# Patient Record
Sex: MALE | Race: WHITE | NOT HISPANIC OR LATINO | Employment: FULL TIME | URBAN - METROPOLITAN AREA
[De-identification: names, ages, dates, MRNs, and addresses within clinical notes are randomized per-mention and may not be internally consistent; named-entity substitution may affect disease eponyms.]

---

## 2020-11-09 ENCOUNTER — HOSPITAL ENCOUNTER (OUTPATIENT)
Facility: HOSPITAL | Age: 20
Discharge: HOME | End: 2020-11-10
Attending: EMERGENCY MEDICINE
Payer: OTHER MISCELLANEOUS

## 2020-11-09 ENCOUNTER — ANESTHESIA EVENT (OUTPATIENT)
Dept: ENDOSCOPY | Facility: HOSPITAL | Age: 20
End: 2020-11-09
Payer: OTHER MISCELLANEOUS

## 2020-11-09 ENCOUNTER — ANESTHESIA (OUTPATIENT)
Dept: ENDOSCOPY | Facility: HOSPITAL | Age: 20
End: 2020-11-09
Payer: OTHER MISCELLANEOUS

## 2020-11-09 DIAGNOSIS — T18.9XXA SWALLOWED FOREIGN BODY: ICD-10-CM

## 2020-11-09 DIAGNOSIS — R07.9 CHEST PAIN: ICD-10-CM

## 2020-11-09 DIAGNOSIS — T17.908A INHALED FOREIGN OBJECT, INITIAL ENCOUNTER: Primary | ICD-10-CM

## 2020-11-09 LAB
ABO + RH BLD: NORMAL
ANION GAP SERPL CALC-SCNC: 8 MMOL/L (ref 8–16)
BASOPHILS # BLD AUTO: 0.08 K/UL (ref 0–0.2)
BASOPHILS NFR BLD: 0.9 % (ref 0–1.9)
BLD GP AB SCN CELLS X3 SERPL QL: NORMAL
BUN SERPL-MCNC: 12 MG/DL (ref 6–20)
CALCIUM SERPL-MCNC: 10.9 MG/DL (ref 8.7–10.5)
CHLORIDE SERPL-SCNC: 103 MMOL/L (ref 95–110)
CO2 SERPL-SCNC: 30 MMOL/L (ref 23–29)
CREAT SERPL-MCNC: 0.8 MG/DL (ref 0.5–1.4)
DIFFERENTIAL METHOD: NORMAL
EOSINOPHIL # BLD AUTO: 0.1 K/UL (ref 0–0.5)
EOSINOPHIL NFR BLD: 0.6 % (ref 0–8)
ERYTHROCYTE [DISTWIDTH] IN BLOOD BY AUTOMATED COUNT: 12.3 % (ref 11.5–14.5)
EST. GFR  (AFRICAN AMERICAN): >60 ML/MIN/1.73 M^2
EST. GFR  (NON AFRICAN AMERICAN): >60 ML/MIN/1.73 M^2
GLUCOSE SERPL-MCNC: 91 MG/DL (ref 70–110)
HCT VFR BLD AUTO: 51.7 % (ref 40–54)
HGB BLD-MCNC: 17.5 G/DL (ref 14–18)
IMM GRANULOCYTES # BLD AUTO: 0.02 K/UL (ref 0–0.04)
IMM GRANULOCYTES NFR BLD AUTO: 0.2 % (ref 0–0.5)
LYMPHOCYTES # BLD AUTO: 3.5 K/UL (ref 1–4.8)
LYMPHOCYTES NFR BLD: 39.1 % (ref 18–48)
MCH RBC QN AUTO: 31 PG (ref 27–31)
MCHC RBC AUTO-ENTMCNC: 33.8 G/DL (ref 32–36)
MCV RBC AUTO: 92 FL (ref 82–98)
MONOCYTES # BLD AUTO: 0.8 K/UL (ref 0.3–1)
MONOCYTES NFR BLD: 8.6 % (ref 4–15)
NEUTROPHILS # BLD AUTO: 4.6 K/UL (ref 1.8–7.7)
NEUTROPHILS NFR BLD: 50.6 % (ref 38–73)
NRBC BLD-RTO: 0 /100 WBC
PLATELET # BLD AUTO: 227 K/UL (ref 150–350)
PMV BLD AUTO: 10.7 FL (ref 9.2–12.9)
POTASSIUM SERPL-SCNC: 4.1 MMOL/L (ref 3.5–5.1)
RBC # BLD AUTO: 5.65 M/UL (ref 4.6–6.2)
SARS-COV-2 RDRP RESP QL NAA+PROBE: NEGATIVE
SODIUM SERPL-SCNC: 141 MMOL/L (ref 136–145)
WBC # BLD AUTO: 9.02 K/UL (ref 3.9–12.7)

## 2020-11-09 PROCEDURE — U0002 COVID-19 LAB TEST NON-CDC: HCPCS

## 2020-11-09 PROCEDURE — 25000003 PHARM REV CODE 250: Performed by: STUDENT IN AN ORGANIZED HEALTH CARE EDUCATION/TRAINING PROGRAM

## 2020-11-09 PROCEDURE — D9220A PRA ANESTHESIA: Mod: ,,, | Performed by: ANESTHESIOLOGY

## 2020-11-09 PROCEDURE — 63600175 PHARM REV CODE 636 W HCPCS: Performed by: STUDENT IN AN ORGANIZED HEALTH CARE EDUCATION/TRAINING PROGRAM

## 2020-11-09 PROCEDURE — 37000008 HC ANESTHESIA 1ST 15 MINUTES: Performed by: INTERNAL MEDICINE

## 2020-11-09 PROCEDURE — 93010 EKG 12-LEAD: ICD-10-PCS | Mod: ,,, | Performed by: INTERNAL MEDICINE

## 2020-11-09 PROCEDURE — G0378 HOSPITAL OBSERVATION PER HR: HCPCS

## 2020-11-09 PROCEDURE — 93005 ELECTROCARDIOGRAM TRACING: CPT

## 2020-11-09 PROCEDURE — 93010 ELECTROCARDIOGRAM REPORT: CPT | Mod: ,,, | Performed by: INTERNAL MEDICINE

## 2020-11-09 PROCEDURE — 80048 BASIC METABOLIC PNL TOTAL CA: CPT

## 2020-11-09 PROCEDURE — 85025 COMPLETE CBC W/AUTO DIFF WBC: CPT

## 2020-11-09 PROCEDURE — 86901 BLOOD TYPING SEROLOGIC RH(D): CPT

## 2020-11-09 PROCEDURE — 37000009 HC ANESTHESIA EA ADD 15 MINS: Performed by: INTERNAL MEDICINE

## 2020-11-09 PROCEDURE — 43235 EGD DIAGNOSTIC BRUSH WASH: CPT | Performed by: INTERNAL MEDICINE

## 2020-11-09 PROCEDURE — 99285 EMERGENCY DEPT VISIT HI MDM: CPT | Mod: 25

## 2020-11-09 PROCEDURE — D9220A PRA ANESTHESIA: ICD-10-PCS | Mod: ,,, | Performed by: ANESTHESIOLOGY

## 2020-11-09 RX ORDER — TALC
6 POWDER (GRAM) TOPICAL NIGHTLY PRN
Status: DISCONTINUED | OUTPATIENT
Start: 2020-11-09 | End: 2020-11-10 | Stop reason: HOSPADM

## 2020-11-09 RX ORDER — PROPOFOL 10 MG/ML
VIAL (ML) INTRAVENOUS
Status: DISCONTINUED | OUTPATIENT
Start: 2020-11-09 | End: 2020-11-09

## 2020-11-09 RX ORDER — SUCCINYLCHOLINE CHLORIDE 20 MG/ML
INJECTION INTRAMUSCULAR; INTRAVENOUS
Status: DISPENSED
Start: 2020-11-09 | End: 2020-11-10

## 2020-11-09 RX ORDER — ROCURONIUM BROMIDE 10 MG/ML
INJECTION, SOLUTION INTRAVENOUS
Status: DISCONTINUED | OUTPATIENT
Start: 2020-11-09 | End: 2020-11-09

## 2020-11-09 RX ORDER — LIDOCAINE HYDROCHLORIDE 20 MG/ML
INJECTION, SOLUTION EPIDURAL; INFILTRATION; INTRACAUDAL; PERINEURAL
Status: DISPENSED
Start: 2020-11-09 | End: 2020-11-10

## 2020-11-09 RX ORDER — FENTANYL CITRATE 50 UG/ML
INJECTION, SOLUTION INTRAMUSCULAR; INTRAVENOUS
Status: COMPLETED
Start: 2020-11-09 | End: 2020-11-09

## 2020-11-09 RX ORDER — PHENYLEPHRINE HCL IN 0.9% NACL 1 MG/10 ML
SYRINGE (ML) INTRAVENOUS
Status: DISCONTINUED
Start: 2020-11-09 | End: 2020-11-09 | Stop reason: WASHOUT

## 2020-11-09 RX ORDER — PROPOFOL 10 MG/ML
VIAL (ML) INTRAVENOUS CONTINUOUS PRN
Status: DISCONTINUED | OUTPATIENT
Start: 2020-11-09 | End: 2020-11-09

## 2020-11-09 RX ORDER — SODIUM CHLORIDE 0.9 % (FLUSH) 0.9 %
10 SYRINGE (ML) INJECTION
Status: DISCONTINUED | OUTPATIENT
Start: 2020-11-09 | End: 2020-11-10 | Stop reason: HOSPADM

## 2020-11-09 RX ORDER — ONDANSETRON 2 MG/ML
INJECTION INTRAMUSCULAR; INTRAVENOUS
Status: DISCONTINUED | OUTPATIENT
Start: 2020-11-09 | End: 2020-11-09

## 2020-11-09 RX ORDER — ACETAMINOPHEN 325 MG/1
650 TABLET ORAL EVERY 6 HOURS PRN
Status: DISCONTINUED | OUTPATIENT
Start: 2020-11-09 | End: 2020-11-10 | Stop reason: HOSPADM

## 2020-11-09 RX ORDER — MIDAZOLAM HYDROCHLORIDE 1 MG/ML
INJECTION, SOLUTION INTRAMUSCULAR; INTRAVENOUS
Status: DISCONTINUED | OUTPATIENT
Start: 2020-11-09 | End: 2020-11-09

## 2020-11-09 RX ORDER — LIDOCAINE HYDROCHLORIDE 20 MG/ML
INJECTION INTRAVENOUS
Status: DISCONTINUED | OUTPATIENT
Start: 2020-11-09 | End: 2020-11-09

## 2020-11-09 RX ORDER — PROPOFOL 10 MG/ML
INJECTION, EMULSION INTRAVENOUS
Status: COMPLETED
Start: 2020-11-09 | End: 2020-11-09

## 2020-11-09 RX ORDER — EPHEDRINE SULFATE 50 MG/ML
INJECTION, SOLUTION INTRAVENOUS
Status: DISCONTINUED
Start: 2020-11-09 | End: 2020-11-09 | Stop reason: WASHOUT

## 2020-11-09 RX ORDER — MIDAZOLAM HYDROCHLORIDE 1 MG/ML
INJECTION INTRAMUSCULAR; INTRAVENOUS
Status: COMPLETED
Start: 2020-11-09 | End: 2020-11-09

## 2020-11-09 RX ORDER — FENTANYL CITRATE 50 UG/ML
INJECTION, SOLUTION INTRAMUSCULAR; INTRAVENOUS
Status: DISCONTINUED | OUTPATIENT
Start: 2020-11-09 | End: 2020-11-09

## 2020-11-09 RX ORDER — ROCURONIUM BROMIDE 10 MG/ML
INJECTION, SOLUTION INTRAVENOUS
Status: DISPENSED
Start: 2020-11-09 | End: 2020-11-10

## 2020-11-09 RX ORDER — ONDANSETRON 2 MG/ML
INJECTION INTRAMUSCULAR; INTRAVENOUS
Status: COMPLETED
Start: 2020-11-09 | End: 2020-11-09

## 2020-11-09 RX ORDER — SODIUM CHLORIDE 9 MG/ML
INJECTION, SOLUTION INTRAVENOUS CONTINUOUS PRN
Status: DISCONTINUED | OUTPATIENT
Start: 2020-11-09 | End: 2020-11-09

## 2020-11-09 RX ADMIN — ONDANSETRON 4 MG: 2 INJECTION, SOLUTION INTRAMUSCULAR; INTRAVENOUS at 04:11

## 2020-11-09 RX ADMIN — MIDAZOLAM 2 MG: 1 INJECTION INTRAMUSCULAR; INTRAVENOUS at 04:11

## 2020-11-09 RX ADMIN — SUGAMMADEX 200 MG: 100 INJECTION, SOLUTION INTRAVENOUS at 05:11

## 2020-11-09 RX ADMIN — SUGAMMADEX 400 MG: 100 INJECTION, SOLUTION INTRAVENOUS at 05:11

## 2020-11-09 RX ADMIN — ROCURONIUM BROMIDE 50 MG: 10 INJECTION, SOLUTION INTRAVENOUS at 04:11

## 2020-11-09 RX ADMIN — SODIUM CHLORIDE: 0.9 INJECTION, SOLUTION INTRAVENOUS at 03:11

## 2020-11-09 RX ADMIN — PROPOFOL 50 MCG/KG/MIN: 10 INJECTION, EMULSION INTRAVENOUS at 04:11

## 2020-11-09 RX ADMIN — Medication 100 MG: at 04:11

## 2020-11-09 RX ADMIN — FENTANYL CITRATE 50 MCG: 50 INJECTION, SOLUTION INTRAMUSCULAR; INTRAVENOUS at 04:11

## 2020-11-09 RX ADMIN — PROPOFOL 150 MG: 10 INJECTION, EMULSION INTRAVENOUS at 04:11

## 2020-11-09 RX ADMIN — FENTANYL CITRATE 25 MCG: 50 INJECTION, SOLUTION INTRAMUSCULAR; INTRAVENOUS at 04:11

## 2020-11-09 NOTE — CONSULTS
.[facility]  Gastroenterology  Consult Note    Patient Name: Per Thomas  MRN: [id number]  Admission Date: 11/9/2020  Hospital Length of Stay: 0 days  Code Status: No Order   Primary Care Physician: To Obtain Unable  Principal Problem:<principal problem not specified>    Consults  Subjective:     Chief complaint: Chest pain.    HPI: The patient is a 20 year old male with a no medical history presenting with chest and abdominal pain after accidentally swallowing a toothpick.  He reports accidentally inhaling and swallowing a toothpick around 2 am today.  He immediately developed chest and upper abdominal pain, which is exacerbated by taking a breath.  He has been able to drink liquids but reports significant pain.  He denies nausea or vomiting.  No fever or chills.  His last bowel movement was early this morning just prior to the incident.      Past medical history:  Denies.    Past surgical history:  Appendectomy.  Adenoidectomy.    Social history:  Tobacco use: denies.  Alcohol use: denies.  Illicit drug use: denies.    Family history:  No family history of colorectal cancer.    Medications:  (Not in a hospital admission)      Allergies:  No known drug allergies.    Review of systems:  CONSTITUTIONAL: Negative for fever, chills, weakness, weight loss, weight gain.  HEENT: Negative for blurred vision, hearing loss, nasal congestion, dry mouth, sore throat.  CARDIOVASCULAR: Positive for chest pain.  RESPIRATORY: Negative for SOB or cough.  GASTROINTESTINAL: See HPI  GENITOURINARY: Negative for dysuria or hematuria.  MUSCULOSKELETAL: Negative for osteoarthritis or muscle pain.  SKIN: Negative for rashes/lesions.  NEUROLOGIC: Negative for headaches, numbness/tingling.  ENDOCRINE: Negative for diabetes or thyroid abnormalities.  HEMATOLOGIC: Negative for anemia or blood dyscrasias.    Objective:     Vital Signs (Most Recent):  Temp: 97.8 °F (36.6 °C) (11/09/20 1056)  Pulse: 65 (11/09/20 1057)  Resp:  20 (11/09/20 1057)  BP: 139/70 (11/09/20 1057)  SpO2: 96 % (11/09/20 1057) Vital Signs (24h Range):  Temp:  [97.8 °F (36.6 °C)] 97.8 °F (36.6 °C)  Pulse:  [65] 65  Resp:  [20] 20  SpO2:  [96 %] 96 %  BP: (139)/(70) 139/70     Physical examination:  General: Well developed WM in no acute distress.  HENT: NCAT, atraumatic, hearing grossly intact, no visible or palpable thyroid mass  Eyes: PERRL, EOMI, anicteric sclera  Cardiovascular: Regular rate and rhythm. No peripheral edema.   Lungs: Non-labored respirations. Breath sounds equal.   Abdomen: soft, mild epigastric/LUQ TTP, nondistended, normoactive BS  Extremities: No C/C, 2+ dorsalis pedis pulses bilaterally  Neuro: AA&O x 3, no asterixes or tremors  Psych: Appropriate mood and affect. No SI.  Skin: No jaundice, rashes or lesions  Musculoskeletal: 5/5 strength bilaterally    CBC: No results for input(s): WBC, HGB, HCT, PLT in the last 48 hours.    Imaging:  CT abd/chest without contrast (11/9/20):  Impression:  No acute process seen within the chest or imaged abdomen.  Specifically, no radiodense foreign body identified within the trachea, esophagus, stomach or imaged bowel loops.  Suspected postsurgical changes of remote appendectomy.    X-ray chest (11/9/20):  Impression:  No radiographic acute intrathoracic process seen.  No radiodense foreign body projected over the imaged chest or upper abdomen.      Assessment:   20 year old male with a no medical history presenting with chest and abdominal pain after accidentally swallowing a toothpick.  No obvious evidence of foreign body on CT or x-ray.  VSS.    Plan:   1.  EGD +/- removal of foreign body today.  2.  NPO.  3.  Anesthesiology evaluation.    Thank you for your consult.     Brynn Soares PA-C  Gastroenterology  [facility]

## 2020-11-09 NOTE — TRANSFER OF CARE
Anesthesia Transfer of Care Note    Patient: Per Thomas    Procedure(s) Performed: Procedure(s) (LRB):  EGD (ESOPHAGOGASTRODUODENOSCOPY) (N/A)    Patient location: GI    Anesthesia Type: general    Transport from OR: Transported from OR on room air with adequate spontaneous ventilation    Post pain: adequate analgesia    Post assessment: no apparent anesthetic complications and tolerated procedure well    Post vital signs: stable    Level of consciousness: awake    Nausea/Vomiting: no nausea/vomiting    Complications: none    Transfer of care protocol was followed      Last vitals:   Visit Vitals  /66   Pulse 108   Temp 36.5 °C (97.7 °F) (Skin)   Resp 18   Ht 6' (1.829 m)   Wt 74.8 kg (165 lb)   SpO2 96%   BMI 22.38 kg/m²

## 2020-11-09 NOTE — ED PROVIDER NOTES
"Encounter Date: 11/9/2020       History     Chief Complaint   Patient presents with    Chest Pain     pt staes he inhaled a toothpick at 2 am     Mr. Thomas is a 21 y/o  male with a PSHx of an appendectomy who presents today after inhaling a toothpick about 9 hours ago. 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 He initially felt pain in the pack of his throat, then in his chest at nipple level. He was sent to urgent care in [facility] by his work at around 8 am, when he drank water which was painful "going down." They then sent him to the ED, and he currently states that he his experiencing pleuritic, sternal chest pain at nipple level as well as left sided abdominal pain. He denies taking any medication for the pain, as well as SOB, urinary complaints, nausea, diarrhea, or trouble swallowing. He tried to induce vomiting on 3 separate occasions with no success.    The history is provided by the patient. No  was used.     Review of patient's allergies indicates:  No Known Allergies  Past Medical History:   Diagnosis Date    Childhood asthma     History of Asperger's syndrome     Inhaled foreign object 11/09/2020    toothpick     Past Surgical History:   Procedure Laterality Date    ADENOIDECTOMY      APPENDECTOMY      ESOPHAGOGASTRODUODENOSCOPY N/A 11/9/2020    Procedure: EGD (ESOPHAGOGASTRODUODENOSCOPY);  Surgeon: Geovanny Isidro MD;  Location: [facility];  Service: Gastroenterology;  Laterality: N/A;    TONSILLECTOMY       History reviewed. No pertinent family history.  Social History     Tobacco Use    Smoking status: Never Smoker    Smokeless tobacco: Never Used   Substance Use Topics    Alcohol use: Never     Frequency: Never    Drug use: Not Currently     Review of Systems  Constitutional: Negative for fever.   HENT: Positive for " sore throat. Negative for trouble swallowing.    Respiratory: Negative for shortness of breath.    Cardiovascular: Positive for chest pain.   Gastrointestinal: Positive for abdominal pain. Negative for constipation, diarrhea, nausea and vomiting.   Genitourinary: Negative for difficulty urinating and dysuria.   Musculoskeletal: Negative for back pain.   Skin: Negative for rash.   Neurological: Negative for weakness and headaches.   Hematological: Does not bruise/bleed easily.   Physical Exam     Initial Vitals   BP Pulse Resp Temp SpO2   11/09/20 1057 11/09/20 1057 11/09/20 1057 11/09/20 1056 11/09/20 1057   139/70 65 20 97.8 °F (36.6 °C) 96 %      MAP       --                Physical Exam  Constitutional: He appears well-developed and well-nourished. No distress.   HENT:   Head: Normocephalic and atraumatic.   Eythematous, moist oropharynx without exudate. Swallowing intact.   Eyes: Pupils are equal, round, and reactive to light.   Neck: Normal range of motion. Neck supple.   Cardiovascular: Normal rate, regular rhythm, normal heart sounds and intact distal pulses.   Pulmonary/Chest: Breath sounds normal. No respiratory distress. He has no wheezes. He has no rhonchi. He has no rales. He exhibits no tenderness.   Abdominal: Soft. Bowel sounds are normal. He exhibits no distension. There is abdominal tenderness (Epigastrum and left sided). There is no rebound and no guarding.   Musculoskeletal: Normal range of motion.   Neurological: He is alert and oriented to person, place, and time. He has normal strength.   Skin: Skin is warm and dry. Capillary refill takes less than 2 seconds.      ED Course   Procedures  Labs Reviewed   CBC W/ AUTO DIFFERENTIAL     EKG Readings: (Independently Interpreted)   Rhythm: Sinus Arrhythmia. Heart Rate: 75. Ectopy: No Ectopy. Clinical Impression: Normal Sinus Rhythm     ECG Results          EKG 12-lead (In process)  Result time 11/10/20 06:12:47    In process by Interface, Lab In  [facility] (11/10/20 06:12:47)                 Narrative:    Test Reason : R07.9,    Vent. Rate : 075 BPM     Atrial Rate : 075 BPM     P-R Int : 124 ms          QRS Dur : 082 ms      QT Int : 380 ms       P-R-T Axes : 026 080 053 degrees     QTc Int : 424 ms    Normal sinus rhythm with sinus arrhythmia  Normal ECG  No previous ECGs available    Referred By: [email or web address]   SELF           Confirmed By:                   In process by Interface, Lab In [facility] (11/10/20 06:10:25)                 Narrative:    Test Reason : R07.9,    Vent. Rate : 075 BPM     Atrial Rate : 075 BPM     P-R Int : 124 ms          QRS Dur : 082 ms      QT Int : 380 ms       P-R-T Axes : 026 080 053 degrees     QTc Int : 424 ms    Normal sinus rhythm with sinus arrhythmia  Normal ECG  No previous ECGs available    Referred By: [email or web address]   SELF           Confirmed By:                             Imaging Results          CT Chest Abdomen Without Contrast (XPD) (Final result)  Result time 11/09/20 14:11:02    Final result by Monico Bills MD (11/09/20 14:11:02)                 Impression:      No acute process seen within the chest or imaged abdomen.  Specifically, no radiodense foreign body identified within the trachea, esophagus, stomach or imaged bowel loops.    Suspected postsurgical changes of remote appendectomy.      Electronically signed by: Monico Bills MD  Date:    11/09/2020  Time:    14:11             Narrative:    EXAMINATION:  CT CHEST ABDOMEN WITHOUT CONTRAST (XPD)    CLINICAL HISTORY:  swallowed foreign body vs aspirated foreign body;    TECHNIQUE:  Axial CT of the chest and abdomen without intravenous contrast.  Coronal and sagittal reconstructions of the abdominal aorta and visceral arteries generated.    COMPARISON:  Chest radiograph same day    FINDINGS:  CHEST: No radiodense foreign body identified within the imaged chest including the trachea or esophagus.    Soft tissue structures of the base of the neck are  unremarkable.    The esophagus maintains a normal course and caliber. There is no axillary, mediastinal, or hilar lymphadenopathy.    The trachea is midline, the proximal airways are patent and the lungs are symmetrically expanded.   Examination of the lung fields demonstrates no evidence of consolidation, pneumothorax, mass, or significant pleural thickening, nor is there evidence of pleural fluid.    The heart is normal in size and there is no evidence of pericardial effusion.  There are coronary artery calcifications.    Evaluation of vascular structures is limited without the use of intravenous contrast.  Left-sided 3 vessel arch.  No significant atherosclerosis.  No aortic aneurysm.    ABDOMEN: No radiodense foreign body identified within the imaged abdomen including the stomach and imaged small and large bowel loops.    The liver is normal in size and attenuation with no focal hepatic abnormality.  There is no intra-or extrahepatic biliary ductal dilatation. The gallbladder, stomach, spleen, pancreas, and adrenal glands are unremarkable.    The kidneys are normal in size and location.  No hydronephrosis or significant perinephric stranding.  No radiodense calculus seen within the kidneys on either side or imaged proximal and mid.  Distal ureters not included for evaluation.  Urinary bladder not included for evaluation.  Proximal and mid ureters are nondilated.    The visualized loops of small and large bowel show no evidence of obstruction or inflammation.  Postsurgical changes at the base of the cecum with nonvisualization of the appendix suggesting prior appendectomy.  There is no ascites, free fluid, or intraperitoneal free air of the imaged abdomen.  There is no evidence of lymph node enlargement in the abdomen.    No significant atherosclerosis.  Aorta tapers normally.    Osseous structures appear intact.  The extraperitoneal soft tissues are unremarkable.                               X-Ray Chest PA And  Lateral (Final result)  Result time 11/09/20 12:40:54    Final result by Monico Bills MD (11/09/20 12:40:54)                 Impression:      No radiographic acute intrathoracic process seen.    No radiodense foreign body projected over the imaged chest or upper abdomen.      Electronically signed by: Monico Bills MD  Date:    11/09/2020  Time:    12:40             Narrative:    EXAMINATION:  XR CHEST PA AND LATERAL    CLINICAL HISTORY:  Foreign body of alimentary tract, part unspecified, initial encounter    TECHNIQUE:  PA and lateral views of the chest were performed.    COMPARISON:  None    FINDINGS:  Trachea is midline.  No radiodense foreign body projected over the imaged chest or upper abdomen.The lungs are relatively symmetrically well expanded and clear, with normal appearance of pulmonary vasculature and no pleural effusion or pneumothorax.    The cardiac silhouette is normal in size. The hilar and mediastinal contours are unremarkable.    Bones are intact.                              MDM  Initial assessment:  Pt is a 21 yo male who swallowed or inhaled a toothpick and now has retrosternal chest pain and left upper quad abd pain. On pe normal assessment.  Ddx: swallowed or inhaled foreign body.  Plan CXr. SBAR given to .                       ED Course as of Nov 10 1502   Mon Nov 09, 2020   1117 BP: 139/70 [VC]   1117 Temp: 97.8 °F (36.6 °C) [VC]   1117 Pulse: 65 [VC]   1117 Resp: 20 [VC]   1117 SpO2: 96 % [VC]   1246 o radiographic acute intrathoracic process seen.     No radiodense foreign body projected over the imaged chest or upper abdomen.   X-Ray Chest PA And Lateral [VC]   1302 SBAR given to Dr. Myers, she requested I call radiology for next steps.  Radiologist recommends ct chest abd wo, ordered.    [VC]   1415 No acute process seen within the chest or imaged abdomen.  Specifically, no radiodense foreign body identified within the trachea, esophagus, stomach or imaged bowel  loops.     Suspected postsurgical changes of remote appendectomy.   CT Chest Abdomen Without Contrast (XPD) [VC]   1446 Waiting for gi consult.    [VC]   1459 BP(!): 100/49 [VC]   1459 Pulse(!): 49 [VC]   1459 Pt had reported vasovagal response to IV start, now completely recovered.   SpO2: 98 % [VC]   1903 Pt was taken to endoscopy, and has returned.  SBAR was taken from endoscopy nurse by me.  I will obtain poct covid test, then place order for obs.  CLARISA Alvarez has already accepted report.    [VC]   1904 CBC: leukocyte count was normal, the H&H was normal. The platelet count was normal.        CBC Auto Differential [VC]   1904 Basic metabolic panel(!) [VC]   1904 Group & Rh: A POS [VC]   1904 SARS-CoV-2 RNA, Amplification, Qual: Negative [VC]   1904 BP: 130/61 [VC]   1904 Temp src: Oral [VC]   1905 Pulse: 96 [VC]   1905 Resp: 20 [VC]   1905 SpO2: 97 % [VC]   1936 Pts mother stating she has not been given his plan of care. I have paged Dr. Guerra for more information.    [VC]   1945 Dr. Villanueva in room now discussing care plan with mother.  Pt sleeping quietly in bed beside her. I have paged GI to obtain full plan of care so that I may liaise to obs caregiver.    [VC]      ED Course User Index  [VC] Abhijit Vale DNP            Clinical Impression:       ICD-10-CM ICD-9-CM   1. Inhaled foreign object, initial encounter  T17.908A 932     E912   2. Chest pain  R07.9 786.50   3. Swallowed foreign body  T18.9XXA 938                      Disposition:   Disposition: Placed in Observation  Condition: Stable     ED Disposition Condition    Observation                             Abhijit Vale DNP  11/09/20 1906       Abhijit Vale DNP  11/10/20 1502

## 2020-11-09 NOTE — ANESTHESIA PROCEDURE NOTES
Intubation  Performed by: Marilynn VALADEZ Do, CRNA  Authorized by: Isaak Meyer MD     Intubation:     Induction:  Intravenous    Intubated:  Postinduction    Mask Ventilation:  Easy mask    Attempts:  1    Attempted By:  CRNA    Method of Intubation:  Direct    Blade:  Boykin 2    Laryngeal View Grade: Grade I - full view of chords      Difficult Airway Encountered?: No      Complications:  None    Airway Device:  Oral endotracheal tube    Airway Device Size:  7.5    Style/Cuff Inflation:  Cuffed (inflated to minimal occlusive pressure)    Tube secured:  22    Secured at:  The lips    Placement Verified By:  Capnometry    Complicating Factors:  None    Findings Post-Intubation:  BS equal bilateral

## 2020-11-09 NOTE — FIRST PROVIDER EVALUATION
Emergency Department TeleTriage Encounter Note      CHIEF COMPLAINT    Chief Complaint   Patient presents with    Chest Pain     pt staes he inhaled a toothpick at 2 am       VITAL SIGNS   Initial Vitals   BP Pulse Resp Temp SpO2   11/09/20 1057 11/09/20 1057 11/09/20 1057 11/09/20 1056 11/09/20 1057   139/70 65 20 97.8 °F (36.6 °C) 96 %      MAP       --                   ALLERGIES    Review of patient's allergies indicates:  No Known Allergies    PROVIDER TRIAGE NOTE  This is a teletriage evaluation of a 20 y.o. male presenting to the ED with c/o lower chest pain following inhalation of a toothpick at 2am. Reports has xray on a disk. Initial orders will be placed and care will be transferred to an alternate provider when patient is roomed for a full evaluation. Any additional orders and the final disposition will be determined by that provider.         ORDERS  Labs Reviewed - No data to display    ED Orders (720h ago, onward)    Start Ordered     Status Ordering Provider    11/09/20 1055 11/09/20 1054  EKG 12-lead  Once  Completed    Completed by RUBÉN LUJAN on 11/9/2020 at 10:56 AM CANDACE CHUNG            Virtual Visit Note: The provider triage portion of this emergency department evaluation and documentation was performed via [employer], a HIPAA-compliant telemedicine application, in concert with a tele-presenter in the room. A face to face patient evaluation with one of my colleagues will occur once the patient is placed in an emergency department room.      DISCLAIMER: This note was prepared with [employer] voice recognition transcription software. Garbled syntax, mangled pronouns, and other bizarre constructions may be attributed to that software system.

## 2020-11-09 NOTE — MEDICAL/APP STUDENT
"  History     Chief Complaint   Patient presents with    Chest Pain     pt staes he inhaled a toothpick at 2 am     Mr. Thomas is a 21 y/o  male with a PSHx of an appendectomy who presents today after inhaling a toothpick about 9 hours ago. 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 He initially felt pain in the pack of his throat, then in his chest at nipple level. He was sent to urgent care in [facility] by his work at around 8 am, when he drank water which was painful "going down." They then sent him to the ED, and he currently states that he his experiencing pleuritic, sternal chest pain at nipple level as well as left sided abdominal pain. He denies taking any medication for the pain, as well as SOB, urinary complaints, nausea, diarrhea, or trouble swallowing. He tried to induce vomiting on 3 separate occasions with no success.    The history is provided by the patient.       History reviewed. No pertinent past medical history.    Past Surgical History:   Procedure Laterality Date    ADENOIDECTOMY      APPENDECTOMY         History reviewed. No pertinent family history.    Social History     Tobacco Use    Smoking status: Never Smoker    Smokeless tobacco: Never Used   Substance Use Topics    Alcohol use: Never     Frequency: Never    Drug use: Not Currently       Review of Systems   Constitutional: Negative for fever.   HENT: Positive for sore throat. Negative for trouble swallowing.    Respiratory: Negative for shortness of breath.    Cardiovascular: Positive for chest pain.   Gastrointestinal: Positive for abdominal pain. Negative for constipation, diarrhea, nausea and vomiting.   Genitourinary: Negative for difficulty urinating and dysuria.   Musculoskeletal: Negative for back pain.   Skin: Negative for rash.   Neurological: Negative for weakness and " headaches.   Hematological: Does not bruise/bleed easily.       Physical Exam   /70 (BP Location: Right arm, Patient Position: Sitting)   Pulse 65   Temp 97.8 °F (36.6 °C)   Resp 20   Ht 6' (1.829 m)   Wt 74.8 kg (165 lb)   SpO2 96%   BMI 22.38 kg/m²     Physical Exam    Constitutional: He appears well-developed and well-nourished. No distress.   HENT:   Head: Normocephalic and atraumatic.   Eythematous, moist oropharynx without exudate. Swallowing intact.   Eyes: Pupils are equal, round, and reactive to light.   Neck: Normal range of motion. Neck supple.   Cardiovascular: Normal rate, regular rhythm, normal heart sounds and intact distal pulses.   Pulmonary/Chest: Breath sounds normal. No respiratory distress. He has no wheezes. He has no rhonchi. He has no rales. He exhibits no tenderness.   Abdominal: Soft. Bowel sounds are normal. He exhibits no distension. There is abdominal tenderness (Epigastrum and left sided). There is no rebound and no guarding.   Musculoskeletal: Normal range of motion.   Neurological: He is alert and oriented to person, place, and time. He has normal strength.   Skin: Skin is warm and dry. Capillary refill takes less than 2 seconds.         ED Course

## 2020-11-09 NOTE — ANESTHESIA PREPROCEDURE EVALUATION
11/09/2020  Per Thomas is a 20 y.o., male.    Anesthesia Evaluation    I have reviewed the Patient Summary Reports.    I have reviewed the Nursing Notes.       Review of Systems  Anesthesia Hx:  No problems with previous Anesthesia  Family Hx of Anesthesia complications: Family Anesthesia Complications are Per patient's mother, 2 family members (cousins) passed away from anesthesia, and she was told it was because of Anectine. She  stated they went to sleep and never woke up.  One of the family member was in his early 30's with no major medical problems. She was advised to let everyone know to not use Anectine on her or anyone in her family. Neither patient nor his mother are  familiar with or have  heard the terms psudocholinesterase deficiency or malignant hyperthermia. She has had surgery w/o any issues, and the patient has had surgeries in the past, last one 2 years ago for emergent lap appendectomy, and he did well w/o any issues. He did let his care team know not to use Anectine.   Denies Personal Hx of Anesthesia complications.   Social:  Non-Smoker    Cardiovascular:  Cardiovascular Normal Exercise tolerance: good  Denies Hypertension.  Denies Dysrhythmias.   Denies Angina.    Pulmonary:  Pulmonary Normal    Renal/:  Renal/ Normal     Hepatic/GI:   No Bowel Prep. Denies PUD. Denies Hiatal Hernia.  Denies GERD. Denies Liver Disease.  Denies Hepatitis. Swallowed toothpick; no N/V   Neurological:  Neurology Normal    Endocrine:  Endocrine Normal        Physical Exam  General:  Well nourished    Airway/Jaw/Neck:  Airway Findings: Mouth Opening: Normal Tongue: Normal  Mallampati: I  TM Distance: Normal, at least 6 cm  Jaw/Neck Findings:  Neck ROM: Normal ROM      Dental:  Dental Findings:    Chest/Lungs:  Chest/Lungs Findings: Normal Respiratory Rate, Clear to auscultation     Heart/Vascular:  Heart  Findings: Rate: Normal  Rhythm: Regular Rhythm        Mental Status:  Mental Status Findings:  Cooperative, Alert and Oriented       Wt Readings from Last 3 Encounters:   11/09/20 74.8 kg (165 lb)     Temp Readings from Last 3 Encounters:   11/09/20 36.5 °C (97.7 °F) (Oral)     BP Readings from Last 3 Encounters:   11/09/20 131/63     Pulse Readings from Last 3 Encounters:   11/09/20 74     Lab Results   Component Value Date    WBC 9.02 11/09/2020    HGB 17.5 11/09/2020    HCT 51.7 11/09/2020    MCV 92 11/09/2020     11/09/2020       CMP  Sodium   Date Value Ref Range Status   11/09/2020 141 136 - 145 mmol/L Final     Potassium   Date Value Ref Range Status   11/09/2020 4.1 3.5 - 5.1 mmol/L Final     Chloride   Date Value Ref Range Status   11/09/2020 103 95 - 110 mmol/L Final     CO2   Date Value Ref Range Status   11/09/2020 30 (H) 23 - 29 mmol/L Final     Glucose   Date Value Ref Range Status   11/09/2020 91 70 - 110 mg/dL Final     BUN   Date Value Ref Range Status   11/09/2020 12 6 - 20 mg/dL Final     Creatinine   Date Value Ref Range Status   11/09/2020 0.8 0.5 - 1.4 mg/dL Final     Calcium   Date Value Ref Range Status   11/09/2020 10.9 (H) 8.7 - 10.5 mg/dL Final     Anion Gap   Date Value Ref Range Status   11/09/2020 8 8 - 16 mmol/L Final     eGFR if    Date Value Ref Range Status   11/09/2020 >60 >60 mL/min/1.73 m^2 Final     eGFR if non    Date Value Ref Range Status   11/09/2020 >60 >60 mL/min/1.73 m^2 Final     Comment:     Calculation used to obtain the estimated glomerular filtration  rate (eGFR) is the CKD-EPI equation.            Anesthesia Plan  Type of Anesthesia, risks & benefits discussed:  Anesthesia Type:  general  Patient's Preference:   Intra-op Monitoring Plan: standard ASA monitors  Intra-op Monitoring Plan Comments:   Post Op Pain Control Plan: multimodal analgesia and per primary service following discharge from PACU  Post Op Pain Control Plan  Comments:   Induction:   IV  Beta Blocker:  Patient is not currently on a Beta-Blocker (No further documentation required).       Informed Consent: Patient understands risks and agrees with Anesthesia plan.  Questions answered. Anesthesia consent signed with patient.  ASA Score: 1  emergent   Day of Surgery Review of History & Physical: I have interviewed and examined the patient. I have reviewed the patient's H&P dated:        Anesthesia Plan Notes: At this time, it is unclear if there's history of pseudocholinesterase deficiency or Malignant Hyperthermia.  Dr. Isidro would prefer for patient to be intubated for procedure. Will prepare a non-triggering anesthetic and flush the machine, change out circuit and CO2 absorber prior to proceeding.        Ready For Surgery From Anesthesia Perspective.

## 2020-11-09 NOTE — ED TRIAGE NOTES
"The patient presents to the ED via personal transportation with a family member. The patient reports that while working on a dredge, around 2 am today, he "inhaled" a toothpick. The patient denied sob, or coughing after incident. The patient reports pain to LUQ of abdomen. The patient reports that he has not had a BM today, but has induced vomiting by sticking his fingers down his throat. Patient reports going to [facility] and had an xray done prior to coming here today. Patient states that when he takes deep breaths, he feels the pain.  "

## 2020-11-09 NOTE — [employer] PATIENT INSTRUCTIONS
Discharge Summary/Instructions after an Endoscopic Procedure  Patient Name: Per Thomas  Patient MRN: [id number]  Patient YOB: 2000 Monday, November 9, 2020  Geovanny Isidro MD  RESTRICTIONS:  During your procedure today, you received medications for sedation.  These   medications may affect your judgment, balance and coordination.  Therefore,   for 24 hours, you have the following restrictions:   - DO NOT drive a car, operate machinery, make legal/financial decisions,   sign important papers or drink alcohol.    ACTIVITY:  Today: no heavy lifting, straining or running due to procedural   sedation/anesthesia.  The following day: return to full activity including work.  DIET:  Eat and drink normally unless instructed otherwise.     TREATMENT FOR COMMON SIDE EFFECTS:  - Mild abdominal pain, nausea, belching, bloating or excessive gas:  rest,   eat lightly and use a heating pad.  - Sore Throat: treat with throat lozenges and/or gargle with warm salt   water.  - Because air was used during the procedure, expelling large amounts of air   from your rectum or belching is normal.  - If a bowel prep was taken, you may not have a bowel movement for 1-3 days.    This is normal.  SYMPTOMS TO WATCH FOR AND REPORT TO YOUR PHYSICIAN:  1. Abdominal pain or bloating, other than gas cramps.  2. Chest pain.  3. Back pain.  4. Signs of infection such as: chills or fever occurring within 24 hours   after the procedure.  5. Rectal bleeding, which would show as bright red, maroon, or black stools.   (A tablespoon of blood from the rectum is not serious, especially if   hemorrhoids are present.)  6. Vomiting.  7. Weakness or dizziness.  GO DIRECTLY TO THE NEAREST EMERGENCY ROOM IF YOU HAVE ANY OF THE FOLLOWING:      Difficulty breathing              Chills and/or fever over 101 F   Persistent vomiting and/or vomiting blood   Severe abdominal pain   Severe chest pain   Black, tarry stools   Bleeding- more than one  tablespoon   Any other symptom or condition that you feel may need urgent attention  Your doctor recommends these additional instructions:  If any biopsies were taken, your doctors clinic will contact you in 1 to 2   weeks with any results.  - Observe patient.   - Clear liquid diet.   - Monitor patient clinically.  Repeat CT A/P and consult surgery if patient   develops abdominal pain, fevers, chills, nausea, vomiting or other signs   concerning for perforation.  For questions, problems or results please call your physician - Geovanny Isidro MD at Work:  [phone].  [facility] can be reached at [phone]     IF A COMPLICATION OR EMERGENCY SITUATION ARISES AND YOU ARE UNABLE TO REACH   YOUR PHYSICIAN - GO DIRECTLY TO THE EMERGENCY ROOM.  Geovanny Isidro MD  11/9/2020 5:10:31 PM  This report has been verified and signed electronically.  [employer]

## 2020-11-10 VITALS
BODY MASS INDEX: 22.35 KG/M2 | TEMPERATURE: 98 F | DIASTOLIC BLOOD PRESSURE: 65 MMHG | OXYGEN SATURATION: 96 % | HEART RATE: 72 BPM | SYSTOLIC BLOOD PRESSURE: 124 MMHG | HEIGHT: 72 IN | WEIGHT: 165 LBS | RESPIRATION RATE: 18 BRPM

## 2020-11-10 LAB
ALBUMIN SERPL BCP-MCNC: 4.2 G/DL (ref 3.5–5.2)
ALBUMIN SERPL BCP-MCNC: 4.3 G/DL (ref 3.5–5.2)
ALP SERPL-CCNC: 69 U/L (ref 55–135)
ALP SERPL-CCNC: 69 U/L (ref 55–135)
ALT SERPL W/O P-5'-P-CCNC: 19 U/L (ref 10–44)
ALT SERPL W/O P-5'-P-CCNC: 20 U/L (ref 10–44)
ANION GAP SERPL CALC-SCNC: 7 MMOL/L (ref 8–16)
ANION GAP SERPL CALC-SCNC: 7 MMOL/L (ref 8–16)
AST SERPL-CCNC: 20 U/L (ref 10–40)
AST SERPL-CCNC: 21 U/L (ref 10–40)
BASOPHILS # BLD AUTO: 0.05 K/UL (ref 0–0.2)
BASOPHILS NFR BLD: 0.8 % (ref 0–1.9)
BILIRUB SERPL-MCNC: 1.3 MG/DL (ref 0.1–1)
BILIRUB SERPL-MCNC: 1.4 MG/DL (ref 0.1–1)
BUN SERPL-MCNC: 11 MG/DL (ref 6–20)
BUN SERPL-MCNC: 11 MG/DL (ref 6–20)
CALCIUM SERPL-MCNC: 9.3 MG/DL (ref 8.7–10.5)
CALCIUM SERPL-MCNC: 9.4 MG/DL (ref 8.7–10.5)
CHLORIDE SERPL-SCNC: 105 MMOL/L (ref 95–110)
CHLORIDE SERPL-SCNC: 107 MMOL/L (ref 95–110)
CO2 SERPL-SCNC: 26 MMOL/L (ref 23–29)
CO2 SERPL-SCNC: 28 MMOL/L (ref 23–29)
CREAT SERPL-MCNC: 0.9 MG/DL (ref 0.5–1.4)
CREAT SERPL-MCNC: 0.9 MG/DL (ref 0.5–1.4)
DIFFERENTIAL METHOD: ABNORMAL
EOSINOPHIL # BLD AUTO: 0 K/UL (ref 0–0.5)
EOSINOPHIL NFR BLD: 0.3 % (ref 0–8)
ERYTHROCYTE [DISTWIDTH] IN BLOOD BY AUTOMATED COUNT: 12.3 % (ref 11.5–14.5)
EST. GFR  (AFRICAN AMERICAN): >60 ML/MIN/1.73 M^2
EST. GFR  (AFRICAN AMERICAN): >60 ML/MIN/1.73 M^2
EST. GFR  (NON AFRICAN AMERICAN): >60 ML/MIN/1.73 M^2
EST. GFR  (NON AFRICAN AMERICAN): >60 ML/MIN/1.73 M^2
GLUCOSE SERPL-MCNC: 91 MG/DL (ref 70–110)
GLUCOSE SERPL-MCNC: 92 MG/DL (ref 70–110)
HCT VFR BLD AUTO: 48.3 % (ref 40–54)
HGB BLD-MCNC: 16.7 G/DL (ref 14–18)
IMM GRANULOCYTES # BLD AUTO: 0.01 K/UL (ref 0–0.04)
IMM GRANULOCYTES NFR BLD AUTO: 0.2 % (ref 0–0.5)
LACTATE SERPL-SCNC: 0.9 MMOL/L (ref 0.5–2.2)
LYMPHOCYTES # BLD AUTO: 1.9 K/UL (ref 1–4.8)
LYMPHOCYTES NFR BLD: 29.7 % (ref 18–48)
MCH RBC QN AUTO: 31.4 PG (ref 27–31)
MCHC RBC AUTO-ENTMCNC: 34.6 G/DL (ref 32–36)
MCV RBC AUTO: 91 FL (ref 82–98)
MONOCYTES # BLD AUTO: 0.8 K/UL (ref 0.3–1)
MONOCYTES NFR BLD: 12.2 % (ref 4–15)
NEUTROPHILS # BLD AUTO: 3.7 K/UL (ref 1.8–7.7)
NEUTROPHILS NFR BLD: 56.8 % (ref 38–73)
NRBC BLD-RTO: 0 /100 WBC
PLATELET # BLD AUTO: 213 K/UL (ref 150–350)
PMV BLD AUTO: 10.8 FL (ref 9.2–12.9)
POTASSIUM SERPL-SCNC: 4.1 MMOL/L (ref 3.5–5.1)
POTASSIUM SERPL-SCNC: 4.5 MMOL/L (ref 3.5–5.1)
PROT SERPL-MCNC: 6.9 G/DL (ref 6–8.4)
PROT SERPL-MCNC: 7.1 G/DL (ref 6–8.4)
RBC # BLD AUTO: 5.32 M/UL (ref 4.6–6.2)
SODIUM SERPL-SCNC: 140 MMOL/L (ref 136–145)
SODIUM SERPL-SCNC: 140 MMOL/L (ref 136–145)
WBC # BLD AUTO: 6.5 K/UL (ref 3.9–12.7)

## 2020-11-10 PROCEDURE — 80053 COMPREHEN METABOLIC PANEL: CPT | Mod: 91

## 2020-11-10 PROCEDURE — 36415 COLL VENOUS BLD VENIPUNCTURE: CPT

## 2020-11-10 PROCEDURE — 85025 COMPLETE CBC W/AUTO DIFF WBC: CPT

## 2020-11-10 PROCEDURE — 94761 N-INVAS EAR/PLS OXIMETRY MLT: CPT

## 2020-11-10 PROCEDURE — G0378 HOSPITAL OBSERVATION PER HR: HCPCS

## 2020-11-10 PROCEDURE — 25000003 PHARM REV CODE 250: Performed by: NURSE PRACTITIONER

## 2020-11-10 PROCEDURE — 83605 ASSAY OF LACTIC ACID: CPT

## 2020-11-10 RX ORDER — SODIUM CHLORIDE 9 MG/ML
INJECTION, SOLUTION INTRAVENOUS CONTINUOUS
Status: DISCONTINUED | OUTPATIENT
Start: 2020-11-10 | End: 2020-11-10 | Stop reason: HOSPADM

## 2020-11-10 RX ADMIN — SODIUM CHLORIDE: 0.9 INJECTION, SOLUTION INTRAVENOUS at 10:11

## 2020-11-10 NOTE — PLAN OF CARE
11/10/20 1025   Discharge Assessment   Assessment Type Discharge Planning Assessment   Assessment information obtained from? Medical Record   Communicated expected length of stay with patient/caregiver yes   Prior to hospitilization cognitive status: Alert/Oriented   Prior to hospitalization functional status: Independent   Current cognitive status: Alert/Oriented   Current Functional Status: Independent   Facility Arrived From: home   Lives With other (see comments)   Able to Return to Prior Arrangements yes   Is patient able to care for self after discharge? Yes   Who are your caregiver(s) and their phone number(s)? [facility]- [phone]   Patient's perception of discharge disposition home or selfcare   Readmission Within the Last 30 Days no previous admission in last 30 days   Patient currently being followed by outpatient case management? No   Patient currently receives any other outside agency services? No   Equipment Currently Used at Home none   Do you have any problems affording any of your prescribed medications? No   Is the patient taking medications as prescribed? yes   Does the patient have transportation home?   (TBD)   Discharge Plan A Home with family  ([facility])   DME Needed Upon Discharge  none   Patient/Family in Agreement with Plan yes

## 2020-11-10 NOTE — HOSPITAL COURSE
Mr. Thomas is a 21 yo male placed in observation for swallowing a tooth pick. EGD showed normal esophagus, normal stomach, normal examined duodenum and toothpick was not visualized. No acute overnight. He reports RLQ discomfort while taking a deep breath this am otherwise feels well. Repeat CBC and lactate normal range. Tolerated CLD advanced to regular diet. Patient stable for discharge home.

## 2020-11-10 NOTE — ASSESSMENT & PLAN NOTE
Unclear if inhaled or swallowed, not visualized in upper airway or upper GI tract during EGD.  Continue observation.  Repeat CT abd/pelvis and Gen Surgery consult if peritoneal signs develop.

## 2020-11-10 NOTE — ED NOTES
11/9/2020 1930   Transport at bedside but patient's mother requesting to speak with provider. Transport states that request will be cancelled.    11/9/2020 2015  Pt's chart dragged to [facility] so ED charting ended at 1933.  2016 vitals bp 122/64 p88 temp 97.9 o2 sats 98% RA 20RR  2018 Transport at bedside. Pt AAOx4 and calm. NAD noted.   2019 Pt transported to med surg bed 433 with transport staff

## 2020-11-10 NOTE — SUBJECTIVE & OBJECTIVE
Past Medical History:   Diagnosis Date    Childhood asthma     History of Asperger's syndrome     Inhaled foreign object 11/09/2020    toothpick       Past Surgical History:   Procedure Laterality Date    ADENOIDECTOMY      APPENDECTOMY      TONSILLECTOMY         Review of patient's allergies indicates:  No Known Allergies    No current facility-administered medications on file prior to encounter.      No current outpatient medications on file prior to encounter.     Family History     None        Tobacco Use    Smoking status: Never Smoker    Smokeless tobacco: Never Used   Substance and Sexual Activity    Alcohol use: Never     Frequency: Never    Drug use: Not Currently    Sexual activity: Not on file     Review of Systems   Constitutional: Negative for chills and fever.   Eyes: Negative for photophobia and visual disturbance.   Respiratory: Negative for cough and shortness of breath.    Cardiovascular: Positive for chest pain. Negative for palpitations and leg swelling.   Gastrointestinal: Positive for abdominal pain. Negative for diarrhea, nausea and vomiting.   Genitourinary: Negative for frequency, hematuria and urgency.   Skin: Negative for pallor, rash and wound.   Neurological: Negative for light-headedness and headaches.   Psychiatric/Behavioral: Negative for confusion and decreased concentration.     Objective:     Vital Signs (Most Recent):  Temp: 97.5 °F (36.4 °C) (11/09/20 2105)  Pulse: 75 (11/09/20 2105)  Resp: 18 (11/09/20 2105)  BP: (!) 101/55 (11/09/20 2105)  SpO2: 95 % (11/09/20 2105) Vital Signs (24h Range):  Temp:  [97.5 °F (36.4 °C)-97.8 °F (36.6 °C)] 97.5 °F (36.4 °C)  Pulse:  [] 75  Resp:  [18-20] 18  SpO2:  [95 %-100 %] 95 %  BP: (100-139)/(49-70) 101/55     Weight: 74.8 kg (165 lb)  Body mass index is 22.38 kg/m².    Physical Exam  Vitals signs and nursing note reviewed.   Constitutional:       General: He is not in acute distress.     Appearance: He is well-developed.    HENT:      Head: Normocephalic and atraumatic.      Right Ear: External ear normal.      Left Ear: External ear normal.      Nose: Nose normal.   Eyes:      Conjunctiva/sclera: Conjunctivae normal.      Pupils: Pupils are equal, round, and reactive to light.   Neck:      Musculoskeletal: Normal range of motion and neck supple.   Cardiovascular:      Rate and Rhythm: Normal rate and regular rhythm.   Pulmonary:      Effort: Pulmonary effort is normal. No respiratory distress.      Breath sounds: Normal breath sounds. No wheezing or rales.   Abdominal:      General: Bowel sounds are normal. There is no distension.      Palpations: Abdomen is soft.      Tenderness: There is no abdominal tenderness.      Comments: No palpable hepatomegaly or splenomegaly   Musculoskeletal: Normal range of motion.         General: No tenderness.   Skin:     General: Skin is warm and dry.   Neurological:      Mental Status: He is alert and oriented to person, place, and time.   Psychiatric:         Thought Content: Thought content normal.           CRANIAL NERVES     CN III, IV, VI   Pupils are equal, round, and reactive to light.       Significant Labs: All pertinent labs within the past 24 hours have been reviewed.    Significant Imaging: I have reviewed all pertinent imaging results/findings within the past 24 hours.

## 2020-11-10 NOTE — PLAN OF CARE
Procedure complete. Awake and alert. No c/o p[ain or discomfort. Vital signs stable. Report given to patient. Verbalized understanding. Transferred back to ED via stretcher. Report given to Parminder. No acute distress noted.

## 2020-11-10 NOTE — NURSING
Patient arrived to floor via stretcher. Patient oriented to floor and room at this time. Basic set up placed at bedside with blue folder. Vital signs are stable. Will complete assessment. Patient has no complaints at this time. Instructed to call for any needs. Will continue to monitor.

## 2020-11-10 NOTE — H&P
[facility] Hospital Medicine  History & Physical    Patient Name: Per Thomas  MRN: [id number]  Admission Date: 11/9/2020  Attending Physician: Cindy Galicia MD   Primary Care Provider: To Obtain Unable         Patient information was obtained from patient, past medical records and ER records.     Subjective:     Principal Problem:Inhaled foreign object    Chief Complaint:   Chief Complaint   Patient presents with    Chest Pain     pt staes he inhaled a toothpick at 2 am        HPI: 20 y.o. male with no diagnosed health problems presents with a complaint of LUQ abdominal pain after either swallowing or inhaling a toothpick accidentally.  After which he felt sharp pain in his posterior throat which traveled down through his chest to his LUQ abd.  Currently pain free.  Denies fever, chills, cough, SOB, or any other associated symptoms.  GI emergently consulted in the ED and perfromed an EGD, toothpick was not visualized.  Per Anesthesia note it seems he was intubated for procedure without visualization of the toothpick in the upper airways either.  Recommendation for observation and serial abdominal exams.  Stat CT abd/plevis and Gen Surgery consult if any peritoneal signs of perforation develop.  Placed in observation.    Past Medical History:   Diagnosis Date    Childhood asthma     History of Asperger's syndrome     Inhaled foreign object 11/09/2020    toothpick       Past Surgical History:   Procedure Laterality Date    ADENOIDECTOMY      APPENDECTOMY      TONSILLECTOMY         Review of patient's allergies indicates:  No Known Allergies    No current facility-administered medications on file prior to encounter.      No current outpatient medications on file prior to encounter.     Family History     None        Tobacco Use    Smoking status: Never Smoker    Smokeless tobacco: Never Used   Substance and Sexual Activity    Alcohol use: Never     Frequency: Never    Drug use:  Not Currently    Sexual activity: Not on file     Review of Systems   Constitutional: Negative for chills and fever.   Eyes: Negative for photophobia and visual disturbance.   Respiratory: Negative for cough and shortness of breath.    Cardiovascular: Positive for chest pain. Negative for palpitations and leg swelling.   Gastrointestinal: Positive for abdominal pain. Negative for diarrhea, nausea and vomiting.   Genitourinary: Negative for frequency, hematuria and urgency.   Skin: Negative for pallor, rash and wound.   Neurological: Negative for light-headedness and headaches.   Psychiatric/Behavioral: Negative for confusion and decreased concentration.     Objective:     Vital Signs (Most Recent):  Temp: 97.5 °F (36.4 °C) (11/09/20 2105)  Pulse: 75 (11/09/20 2105)  Resp: 18 (11/09/20 2105)  BP: (!) 101/55 (11/09/20 2105)  SpO2: 95 % (11/09/20 2105) Vital Signs (24h Range):  Temp:  [97.5 °F (36.4 °C)-97.8 °F (36.6 °C)] 97.5 °F (36.4 °C)  Pulse:  [] 75  Resp:  [18-20] 18  SpO2:  [95 %-100 %] 95 %  BP: (100-139)/(49-70) 101/55     Weight: 74.8 kg (165 lb)  Body mass index is 22.38 kg/m².    Physical Exam  Vitals signs and nursing note reviewed.   Constitutional:       General: He is not in acute distress.     Appearance: He is well-developed.   HENT:      Head: Normocephalic and atraumatic.      Right Ear: External ear normal.      Left Ear: External ear normal.      Nose: Nose normal.   Eyes:      Conjunctiva/sclera: Conjunctivae normal.      Pupils: Pupils are equal, round, and reactive to light.   Neck:      Musculoskeletal: Normal range of motion and neck supple.   Cardiovascular:      Rate and Rhythm: Normal rate and regular rhythm.   Pulmonary:      Effort: Pulmonary effort is normal. No respiratory distress.      Breath sounds: Normal breath sounds. No wheezing or rales.   Abdominal:      General: Bowel sounds are normal. There is no distension.      Palpations: Abdomen is soft.      Tenderness: There is  no abdominal tenderness.      Comments: No palpable hepatomegaly or splenomegaly   Musculoskeletal: Normal range of motion.         General: No tenderness.   Skin:     General: Skin is warm and dry.   Neurological:      Mental Status: He is alert and oriented to person, place, and time.   Psychiatric:         Thought Content: Thought content normal.           CRANIAL NERVES     CN III, IV, VI   Pupils are equal, round, and reactive to light.       Significant Labs: All pertinent labs within the past 24 hours have been reviewed.    Significant Imaging: I have reviewed all pertinent imaging results/findings within the past 24 hours.    Assessment/Plan:     * Inhaled foreign object  Unclear if inhaled or swallowed, not visualized in upper airway or upper GI tract during EGD.  Continue observation.  Repeat CT abd/pelvis and Gen Surgery consult if peritoneal signs develop.      VTE Risk Mitigation (From admission, onward)         Ordered     IP VTE HIGH RISK PATIENT  Once      11/09/20 1941     Place sequential compression device  Until discontinued      11/09/20 1941              As clarification, on 11/09/2020, patient should be placed in hospital observation services under my care in collaboration with MD Lobo Hernandez Jr., APRN, AGASTUP-BC  Hospitalist - Department of Hospital Medicine  [facility] [address] [place] [address]  Office #: [phone]; Pager #: [phone]

## 2020-11-10 NOTE — ED NOTES
Spoke with [facility] regarding pt's chart being moved from endoscopy back to ED. Per Hannah, pt was to be transported from endoscopy to med surg floor room 433.

## 2020-11-10 NOTE — PLAN OF CARE
11/10/20 1128   Final Note   Assessment Type Final Discharge Note   Anticipated Discharge Disposition Home   Hospital Follow Up  Appt(s) scheduled? No   Discharge plans and expectations educations in teach back method with documentation complete? Yes   Right Care Referral Info   Post Acute Recommendation No Care   Post-Acute Status   Post-Acute Authorization Other   Other Status No Post-Acute Service Needs   Pts nurse Marilynn notified that the pt can d/c from CM standpoint.

## 2020-11-10 NOTE — PROGRESS NOTES
ATTEMPTED DISCHARGE TEACHING  ON SWALLOWED FOREIGN BODY, BUT PATIENT WAS DISCHARGED.Becca Horn, RN, BSN, STN [facility]  11/10/2020

## 2020-11-10 NOTE — DISCHARGE SUMMARY
[facility] Hospital Medicine  Discharge Summary      Patient Name: Per Thomas  MRN: [id number]  Admission Date: 11/9/2020  Hospital Length of Stay: 0 days  Discharge Date and Time:  11/10/2020 3:34 PM  Attending Physician: Eddi Herndon MD  Discharging Provider: Alma Lloyd NP  Primary Care Provider: To Obtain Unable      HPI:   20 y.o. male with no diagnosed health problems presents with a complaint of LUQ abdominal pain after either swallowing or inhaling a toothpick accidentally.  After which he felt sharp pain in his posterior throat which traveled down through his chest to his LUQ abd.  Currently pain free.  Denies fever, chills, cough, SOB, or any other associated symptoms.  GI emergently consulted in the ED and perfromed an EGD, toothpick was not visualized.  Per Anesthesia note it seems he was intubated for procedure without visualization of the toothpick in the upper airways either.  Recommendation for observation and serial abdominal exams.  Stat CT abd/plevis and Gen Surgery consult if any peritoneal signs of perforation develop.  Placed in observation.    Procedure(s) (LRB):  EGD (ESOPHAGOGASTRODUODENOSCOPY) (N/A)      Hospital Course:   Mr. Thomas is a 21 yo male placed in observation for swallowing a tooth pick. EGD showed normal esophagus, normal stomach, normal examined duodenum and toothpick was not visualized. No acute overnight. He reports RLQ discomfort while taking a deep breath this am otherwise feels well. Repeat CBC and lactate normal range. Tolerated CLD advanced to regular diet. Patient stable for discharge home.     Consults:     No new Assessment & Plan notes have been filed under this hospital service since the last note was generated.  Service: Hospital Medicine    Final Active Diagnoses:    Diagnosis Date Noted POA    PRINCIPAL PROBLEM:  Inhaled foreign object [T17.908A] 11/09/2020 Yes      Problems Resolved During this Admission:       Discharged  Condition: good    Disposition: Home or Self Care    Follow Up:  Follow-up Information     [facility]    Why: please call at time of discharge to schedule follow up as needed  Contact information:  [address] [place] [id number] [phone]                 Patient Instructions:      Diet Adult Regular     Notify your health care provider if you experience any of the following:  temperature >100.4     Notify your health care provider if you experience any of the following:  difficulty breathing or increased cough     Notify your health care provider if you experience any of the following:  persistent nausea and vomiting or diarrhea     Notify your health care provider if you experience any of the following:  severe uncontrolled pain     Notify your health care provider if you experience any of the following:  increased confusion or weakness     Activity as tolerated       Significant Diagnostic Studies: Labs: All labs within the past 24 hours have been reviewed    Pending Diagnostic Studies:     None         Medications:  Reconciled Home Medications:      Medication List      You have not been prescribed any medications.         Indwelling Lines/Drains at time of discharge:   Lines/Drains/Airways     None                 Time spent on the discharge of patient: 30 minutes  Patient was seen and examined on the date of discharge and determined to be suitable for discharge.         Alma Lloyd NP  Department of Hospital Medicine  [facility]

## 2020-11-10 NOTE — NURSING
Pt cleared by case management,  Informed CM that if pt tolerates regular diet at lunch will discharge pt, ALLYSON Lloyd NP notiified

## 2020-11-10 NOTE — ANESTHESIA POSTPROCEDURE EVALUATION
Anesthesia Post Evaluation    Patient: Per Thomas    Procedure(s) Performed: Procedure(s) (LRB):  EGD (ESOPHAGOGASTRODUODENOSCOPY) (N/A)    Final Anesthesia Type: general    Patient location during evaluation: GI PACU  Patient participation: Yes- Able to Participate  Level of consciousness: awake and alert  Post-procedure vital signs: reviewed and stable  Pain management: adequate  Airway patency: patent    PONV status at discharge: No PONV  Anesthetic complications: no      Cardiovascular status: hemodynamically stable  Respiratory status: unassisted and spontaneous ventilation  Hydration status: euvolemic  Follow-up not needed.      Pt did well with anesthesia during case. He had normal temperature and vital signs (his HR was only elevated briefly during intubation and emergence), and normal EtCO2 except for slightly higher values during wake-up, which is normal. He was monitored in GI PACU for an extended period of time (~1hr) out of abundance of caution.  He did not have any signs/symptoms of malignant hyperthermia.  Prior to discharge from PACU, he was afebrile, had normal HR, and moving all extremities w/o any stiffness/issues. Advised pt to monitor for s/s of MH such as tachycardia, hyperthermia, muscle stiffness/ridgidity, dark colored urine, and to seek immediate medical care if he experiences any of those symptoms or if he has any questions/concerns. Pt states his understanding.     11/09/2020 at 10PM Addendum: Checked in on pt.  He was sleeping, but easily arousable. Denies any fever, tachycardia, stiffness, or dark-colored urine.  VS reviewed; since discharge from PACU, highest T=36.4; HR 70s-90s; BP stable. Pt has no questions or concerns regarding anesthesia.     Vitals Value Taken Time   /61 11/09/20 1822   Temp 36.4 °C (97.5 °F) 11/09/20 1822   Pulse 90 11/09/20 1822   Resp 20 11/09/20 1822   SpO2 97 % 11/09/20 1822       Event Time   Out of Recovery 18:26:00         Pain/David Score:  David Score: 10 (11/9/2020  6:22 PM)

## 2020-11-10 NOTE — ED NOTES
Bed: 40D INFUSION  Expected date:   Expected time:   Means of arrival:   Comments:  Tele-triage Labs

## 2020-11-10 NOTE — PLAN OF CARE
11/10/2020      Per Thomas  [address]  [facility] [address]          Hospital Medicine Dept.  [facility] [address] [place] [address]  [phone] after hours  [phone] fax Per Thomas has been hospitalized at the [facility] since 11/9/2020.  Please excuse the patient from duties.  Patient may return on Friday 11/13/20.  No restrictions.     Please contact me if you have any questions.                  __________________________  Alma Lloyd NP  11/10/2020

## 2020-11-10 NOTE — PROGRESS NOTES
WRITTEN HEALTHCARE DISCHARGE INFORMATION      Things that YOU are RESPONSIBLE for to Manage Your Care At Home:     1. Getting your prescriptions filled.  2. Taking you medications as directed. DO NOT MISS ANY DOSES!  3. Going to your follow-up doctor appointments. This is important because it allows the doctor to monitor your progress and to determine if any changes need to be made to your treatment plan.     If you are unable to make your follow up appointments, please call the number listed and reschedule this appointment.      ____________HELP AT HOME____________________     Experiencing any SIGNS or SYMPTOMS: YOU CAN     Schedule a same day appopintment with your Primary Care Doctor or  you can call [facility] for 24/7 assistance at [phone]     If you are experience any signs or symptoms that have become severe, Call 911 and come to your nearest Emergency Room.     Thank you for choosing [facility] and allowing us to care for you.   From your care management team:      [facility] within 48-72 hours to help manage your care after discharge. Please try to make sure that you answer your phone for this important phone call.    Follow-up Information     [facility]    Why: please call at time of discharge to schedule follow up as needed  Contact information:  [address] [place] [address]  [phone]

## 2020-11-10 NOTE — NURSING
Verbal and written instructions.  Mother present.  Pt educated on discharge instructions, reason for hospitalization,  follow up appointments to schedule,  vitals review, Patient portal, [facility] on call line, COVID-19 discharge instructions and s/s to contact a provider. Pt verbalize understanding of discharge instructions.  LAC removed-pt tolereated procedure.    Pt left in no s/s of distress.

## 2020-11-10 NOTE — PROGRESS NOTES
[facility]  Gastroenterology  Progress Note    Patient Name: Per Thomas  MRN: [id number]  Admission Date: 11/9/2020  Hospital Length of Stay: 0 days  Code Status: Full Code   Attending Provider: Eddi Herndon MD  Primary Care Physician: To Obtain Unable  Principal Problem: Inhaled foreign object    Subjective:     CC= swallowed foreign body    Interval History:     Patient reports feeling much better after sleeping all night.  He denies any further chest pain.  He notes mild RLQ discomfort when he bends forward.  He tolerated liquids and would like to try solid food.  No nausea or vomiting.  He has not had a bowel movement yet.    Review of systems:  General: Negative for fevers, chills.  Cardiovascular:  Negative for chest pain, shortness of breath     Objective:     Vital Signs (Most Recent):  Temp: 97.9 °F (36.6 °C) (11/10/20 0728)  Pulse: 61 (11/10/20 0728)  Resp: 18 (11/10/20 0728)  BP: 129/60 (11/10/20 0728)  SpO2: 99 % (11/10/20 0833) Vital Signs (24h Range):  Temp:  [97.5 °F (36.4 °C)-97.9 °F (36.6 °C)] 97.9 °F (36.6 °C)  Pulse:  [] 61  Resp:  [16-20] 18  SpO2:  [95 %-100 %] 99 %  BP: (100-139)/(49-70) 129/60     Physical examination:  GEN: Well developed WM in no acute distress.  HENT: Normocephalic, anicteric sclera   Cardiovascular: Regular rate and rhythm. No murmurs appreciated.   Chest: Non-labored respirations. Breath sounds equal   Abdomen: Soft, NTND, normoactive BS  Psych: Appropriate mood and affect.   Extermities: No C/C/E. 2+ dorsalis pedis pulses bilaterally    CBC:   Recent Labs   Lab 11/09/20  1455 11/10/20  0627   WBC 9.02 6.50   HGB 17.5 16.7   HCT 51.7 48.3    213     BMP:   Recent Labs   Lab 11/10/20  0818   GLU 91      K 4.5      CO2 28   BUN 11   CREATININE 0.9   CALCIUM 9.4         Imaging:  CT abd/chest without contrast (11/9/20):  Impression:  No acute process seen within the chest or imaged abdomen.  Specifically, no radiodense  foreign body identified within the trachea, esophagus, stomach or imaged bowel loops.  Suspected postsurgical changes of remote appendectomy.     X-ray chest (11/9/20):  Impression:  No radiographic acute intrathoracic process seen.  No radiodense foreign body projected over the imaged chest or upper abdomen.    Assessment:   20 year old male with a no medical history presenting after accidentally swallowing a toothpick.  No evidence of foreign body on imaging or EGD yesterday.  No concerning symptoms overnight with normal labs and stable vitals.  Abdominal exam benign.    Plan:   Ok to advance diet this morning.  If he tolerates, he can be discharged home with strict instructions to return to the ED if he develops abdominal pain, nausea, vomiting, fever or chills.  This was discussed in detail with the patient at the bedside.      Brynn Soares PA-C  Gastroenterology  [facility]

## 2020-11-22 ENCOUNTER — NURSE TRIAGE (OUTPATIENT)
Dept: ADMINISTRATIVE | Facility: CLINIC | Age: 20
End: 2020-11-22

## 2020-11-22 NOTE — TELEPHONE ENCOUNTER
No contact.    Reason for Disposition   Caller has already spoken with another triager and has no further questions.    Additional Information   Negative: Caller is angry or rude (e.g., hangs up, verbally abusive, yelling)   Negative: Caller hangs up   Negative: Caller has already spoken with the PCP and has no further questions.    Protocols used: NO CONTACT OR DUPLICATE CONTACT CALL-A-AH

## 2020-11-22 NOTE — TELEPHONE ENCOUNTER
Pt contacted through Post Procedural Symptom Tracking. Denies any cough, fever, or difficulty breathing since procedure.  States had mild cough for a day after colonscopy a few days ago. Resolved. Pt instructed to call [facility] or contact provider with any changes of condition or concerns.

## 2020-11-22 NOTE — TELEPHONE ENCOUNTER
Reason for Disposition   [1] Follow-up call to recent contact AND [2] information only call, no triage required    Additional Information   Negative: [1] Caller is not with the adult (patient) AND [2] reporting urgent symptoms   Negative: Lab result questions   Negative: Medication questions   Negative: Caller can't be reached by phone   Negative: Caller has already spoken to PCP or another triager   Negative: RN needs further essential information from caller in order to complete triage   Negative: Requesting regular office appointment   Negative: [1] Caller requesting NON-URGENT health information AND [2] PCP's office is the best resource    Protocols used: INFORMATION ONLY CALL - NO TRIAGE-A-

## 2021-07-21 ENCOUNTER — OCCUPATIONAL HEALTH (OUTPATIENT)
Dept: URGENT CARE | Facility: CLINIC | Age: 21
End: 2021-07-21

## 2021-07-21 DIAGNOSIS — Z11.9 ENCOUNTER FOR SCREENING EXAMINATION FOR INFECTIOUS DISEASE: Primary | ICD-10-CM

## 2021-07-21 PROCEDURE — U0003 INFECTIOUS AGENT DETECTION BY NUCLEIC ACID (DNA OR RNA); SEVERE ACUTE RESPIRATORY SYNDROME CORONAVIRUS 2 (SARS-COV-2) (CORONAVIRUS DISEASE [COVID-19]), AMPLIFIED PROBE TECHNIQUE, MAKING USE OF HIGH THROUGHPUT TECHNOLOGIES AS DESCRIBED BY CMS-2020-01-R: HCPCS | Performed by: NURSE PRACTITIONER

## 2021-07-23 ENCOUNTER — TELEPHONE (OUTPATIENT)
Dept: URGENT CARE | Facility: CLINIC | Age: 21
End: 2021-07-23

## 2021-07-23 DIAGNOSIS — U07.1 COVID-19 VIRUS DETECTED: ICD-10-CM

## 2021-07-23 LAB
SARS-COV-2 RNA RESP QL NAA+PROBE: DETECTED
SARS-COV-2- CYCLE NUMBER: 10.34

## 2021-07-24 ENCOUNTER — NURSE TRIAGE (OUTPATIENT)
Dept: ADMINISTRATIVE | Facility: CLINIC | Age: 21
End: 2021-07-24